# Patient Record
(demographics unavailable — no encounter records)

---

## 2017-12-19 NOTE — [facility]
CHEST TWO VIEWS:

12/19/17

 

HISTORY: 

Cough, wheezing, and rhonchi in the right lung base. 

 

COMPARISON:  

None. 

 

FINDINGS:  

Chest two views:

 

Normal cardiac silhouette. The pulmonary vessels and hilum are normal. Costophrenic angles are clear.
 Hyperinflation. Increased interstitial markings are presumed to be chronic and are predominantly in 
the lung bases. An infiltrate cannot be excluded. Adequate aeration of the upper lungs. No pneumothor
ax. No osseous abnormalities. On the lateral projection, small left sided diaphragmatic hernia with f
at is demonstrated. 

 

IMPRESSION:  

1.      Hyperinflation. 

2.      Presumed chronic changes. Interstitial opacity in the lung bases are presumed to be chronic. 
Comparison with prior imaging would be beneficial. If prior imaging is not available, consider short 
term followup radiograph in one week. 

 

POS: [facility]